# Patient Record
Sex: FEMALE | Race: WHITE | HISPANIC OR LATINO | URBAN - METROPOLITAN AREA
[De-identification: names, ages, dates, MRNs, and addresses within clinical notes are randomized per-mention and may not be internally consistent; named-entity substitution may affect disease eponyms.]

---

## 2021-08-25 ENCOUNTER — APPOINTMENT (RX ONLY)
Dept: URBAN - METROPOLITAN AREA CLINIC 139 | Facility: CLINIC | Age: 46
Setting detail: DERMATOLOGY
End: 2021-08-25

## 2021-08-25 DIAGNOSIS — B35.3 TINEA PEDIS: ICD-10-CM | Status: INADEQUATELY CONTROLLED

## 2021-08-25 DIAGNOSIS — L82.0 INFLAMED SEBORRHEIC KERATOSIS: ICD-10-CM

## 2021-08-25 DIAGNOSIS — B35.1 TINEA UNGUIUM: ICD-10-CM

## 2021-08-25 PROBLEM — D23.72 OTHER BENIGN NEOPLASM OF SKIN OF LEFT LOWER LIMB, INCLUDING HIP: Status: ACTIVE | Noted: 2021-08-25

## 2021-08-25 PROCEDURE — ? COUNSELING

## 2021-08-25 PROCEDURE — 17110 DESTRUCTION B9 LES UP TO 14: CPT | Mod: 52

## 2021-08-25 PROCEDURE — 99204 OFFICE O/P NEW MOD 45 MIN: CPT | Mod: 25

## 2021-08-25 PROCEDURE — ? LIQUID NITROGEN

## 2021-08-25 PROCEDURE — ? PRESCRIPTION

## 2021-08-25 RX ORDER — TERBINAFINE HYDROCHLORIDE 250 MG/1
TABLET ORAL AS DIRECTED
Qty: 21 | Refills: 0 | Status: ERX | COMMUNITY
Start: 2021-08-25

## 2021-08-25 RX ORDER — KETOCONAZOLE 20 MG/G
CREAM TOPICAL BID
Qty: 1 | Refills: 3 | Status: ERX | COMMUNITY
Start: 2021-08-25

## 2021-08-25 RX ADMIN — TERBINAFINE HYDROCHLORIDE: 250 TABLET ORAL at 00:00

## 2021-08-25 RX ADMIN — KETOCONAZOLE: 20 CREAM TOPICAL at 00:00

## 2021-08-25 ASSESSMENT — LOCATION DETAILED DESCRIPTION DERM
LOCATION DETAILED: 2ND WEBSPACE RIGHT FOOT
LOCATION DETAILED: RIGHT ANKLE
LOCATION DETAILED: LEFT DORSAL FOOT
LOCATION DETAILED: LEFT AXILLARY VAULT
LOCATION DETAILED: RIGHT AXILLARY VAULT
LOCATION DETAILED: RIGHT POSTERIOR AXILLA
LOCATION DETAILED: 1ST WEBSPACE LEFT FOOT
LOCATION DETAILED: RIGHT ANTERIOR MEDIAL PROXIMAL UPPER ARM

## 2021-08-25 ASSESSMENT — LOCATION ZONE DERM
LOCATION ZONE: AXILLAE
LOCATION ZONE: ARM
LOCATION ZONE: LEG
LOCATION ZONE: FEET

## 2021-08-25 ASSESSMENT — LOCATION SIMPLE DESCRIPTION DERM
LOCATION SIMPLE: RIGHT POSTERIOR AXILLA
LOCATION SIMPLE: LEFT FOOT
LOCATION SIMPLE: RIGHT FOOT
LOCATION SIMPLE: RIGHT ANKLE
LOCATION SIMPLE: RIGHT UPPER ARM
LOCATION SIMPLE: LEFT AXILLARY VAULT
LOCATION SIMPLE: RIGHT AXILLARY VAULT

## 2021-08-25 NOTE — PROCEDURE: LIQUID NITROGEN
Consent: The patient's consent was obtained including but not limited to risks of crusting, scabbing, blistering, scarring, darker or lighter pigmentary change, recurrence, incomplete removal and infection.
Medical Necessity Information: It is in your best interest to select a reason for this procedure from the list below. All of these items fulfill various CMS LCD requirements except the new and changing color options.
Medical Necessity Clause: This procedure was medically necessary because the lesions that were treated were:
Number Of Freeze-Thaw Cycles: 1 freeze-thaw cycle
Show Topical Anesthesia Variable?: Yes
Render Post-Care Instructions In Note?: no
Post-Care Instructions: I reviewed with the patient in detail post-care instructions. Patient is to wear sunprotection, and avoid picking at any of the treated lesions. Pt may apply Vaseline to crusted or scabbing areas.
Duration Of Freeze Thaw-Cycle (Seconds): 5-10
Aperture Size (Optional): C
Detail Level: Zone

## 2021-08-31 ENCOUNTER — RX ONLY (OUTPATIENT)
Age: 46
Setting detail: RX ONLY
End: 2021-08-31

## 2021-08-31 ENCOUNTER — APPOINTMENT (RX ONLY)
Dept: URBAN - METROPOLITAN AREA CLINIC 139 | Facility: CLINIC | Age: 46
Setting detail: DERMATOLOGY
End: 2021-08-31

## 2021-08-31 DIAGNOSIS — Z41.9 ENCOUNTER FOR PROCEDURE FOR PURPOSES OTHER THAN REMEDYING HEALTH STATE, UNSPECIFIED: ICD-10-CM

## 2021-08-31 PROCEDURE — ? PRODUCT LINE (OFFICE PRODUCTS)

## 2021-08-31 PROCEDURE — ? COSMETIC CONSULTATION: SKIN CARE PRODUCTS AND SERVICES

## 2021-08-31 PROCEDURE — ? LASER HAIR REMOVAL

## 2021-08-31 PROCEDURE — ? PRODUCT LINE (ZO SKIN HEALTH)

## 2021-08-31 RX ORDER — HYDROQUINONE 40 MG/G
CREAM TOPICAL
Qty: 28.35 | Refills: 1 | COMMUNITY
Start: 2021-08-31

## 2021-08-31 RX ORDER — TRETIONIN 0.5 MG/G
CREAM TOPICAL
Qty: 20 | Refills: 2 | COMMUNITY
Start: 2021-08-31

## 2021-08-31 NOTE — PROCEDURE: PRODUCT LINE ([facility])
Product 26 Price (In Dollars - Numeric Only, No Special Characters Or $): 0.00
Product 6 Application Directions: AM
Product 31 Units: 0
Product 23 Application Directions: Apply to clean face at bedtime
Product 15 Price (In Dollars - Numeric Only, No Special Characters Or $): 39.00
Product 9 Price (In Dollars - Numeric Only, No Special Characters Or $): 125.00
Name Of Product 16: Exfoliating Polish
Product 21 Price (In Dollars - Numeric Only, No Special Characters Or $): 60.00
Product 12 Application Directions: AM & PM
Product 4 Price (In Dollars - Numeric Only, No Special Characters Or $): 106.00
Name Of Product 24: Exfoliating Accelerator
Assigning Risk Information: Per AMA, level of risk is based upon consequences of the problem(s) addressed at the encounter when appropriately treated. Risk also includes medical decision making related to the need to initiate or forego further testing, treatment and/or hospitalization. Over the counter medication are assigned a risk level of low. Prescription medication management is assigned a risk level of moderate.
Name Of Product 7: Pigment Control Creme
Risk Of Complication Category: Moderate (Prescription Medication Management)
Product 16 Price (In Dollars - Numeric Only, No Special Characters Or $): 67.00
Name Of Product 2: Refissa
Name Of Product 19: Sunscreen with Primer
Name Of Product 13: Gentle Cleanser
Product 24 Price (In Dollars - Numeric Only, No Special Characters Or $): 70.00
Product 4 Application Directions: PM
Product 7 Price (In Dollars - Numeric Only, No Special Characters Or $): 66.00
Allow Plan To Count Towards E/M Coding: Yes
Product 13 Price (In Dollars - Numeric Only, No Special Characters Or $): 45.00
Product 19 Price (In Dollars - Numeric Only, No Special Characters Or $): 65.00
Product 16 Application Directions: 2-3 X per week
Name Of Product 10: Hydrating Créme
Name Of Product 22: Growth Factor Eye Serum
Name Of Product 5: Recovery Créme
Product 24 Application Directions: Apply in morning after washing face
Product 7 Units: 1
Product 10 Price (In Dollars - Numeric Only, No Special Characters Or $): 97.00
Name Of Product 17: Radical Night Repair
Product 22 Price (In Dollars - Numeric Only, No Special Characters Or $): 130.00
Product 2 Application Directions: Pea size at night
Name Of Product 8: Pigment Control and Blending Creme
Product 17 Price (In Dollars - Numeric Only, No Special Characters Or $): 170.00
Name Of Product 14: Hydrating  Cleanser
Name Of Product 20: Complexion Clearing Masque
Product 11 Price (In Dollars - Numeric Only, No Special Characters Or $): 120.00
Name Of Product 3: Eye Brightening Créme
Product 22 Application Directions: Apply am and pm Allow it to penetrate before applying the Eye Brightening Créme
Product 8 Price (In Dollars - Numeric Only, No Special Characters Or $): 64.00
Product 3 Price (In Dollars - Numeric Only, No Special Characters Or $): 140.00
Name Of Product 11: BrightAlive Skin Brightening Créme
Name Of Product 23: Growth Factor Serum
Name Of Product 6: 10% Vitamin C Activating Serum
Name Of Product 18: Smart Tone
Name Of Product 1: Daily Power Defense
Product 23 Price (In Dollars - Numeric Only, No Special Characters Or $): 148.00
Name Of Product 12: Firming Serum
Product 6 Price (In Dollars - Numeric Only, No Special Characters Or $): 93.00
Product 20 Application Directions: 1 -2 X per week
Name Of Product 9: Pigment Control and Brightening Créme
Product 12 Price (In Dollars - Numeric Only, No Special Characters Or $): 235.00
Product 1 Price (In Dollars - Numeric Only, No Special Characters Or $): 150.00
Detail Level: Zone
Name Of Product 15: Exfoliating Cleanser
Name Of Product 21: Instant Pore Refiner
Name Of Product 4: Renewal Creme

## 2021-08-31 NOTE — HPI: COSMETIC (UNWANTED HAIR)
How Did Your Unwanted Hair Appear?: gradual in onset
How Severe Is Your Unwanted Hair?: severe
Additional History: She states she has dark coarse hair in the bikini area and sometimes gets irritation from the hair so coarse and it rubs. She currently has redness and irritation. She would like to have the first treatment for hair reduction in the bikini area today.  She would like to be treated at the top and sides and a little in the inner thigh area but just not to include the center area.  I quote her $300 per treatment and discussed expectations and need to have at least 3 treatments 6-8 weeks apart and then as needed after that.

## 2021-08-31 NOTE — PROCEDURE: PRODUCT LINE (OFFICE PRODUCTS)
Allow Plan To Count Towards E/M Coding: Yes
Product 28 Units: 0
Product 2 Price (In Dollars - Numeric Only, No Special Characters Or $): 46.00
Product 44 Price (In Dollars - Numeric Only, No Special Characters Or $): 0.00
Product 2 Application Directions: Apply a thin layer to scar am and pm
Name Of Product 3: Elta MD UV Clear Broad Spectrum SPF 46
Product 3 Price (In Dollars - Numeric Only, No Special Characters Or $): 32.00
Name Of Product 4: Tretinoin 0.05% cream
Name Of Product 1: Latisse
Product 4 Price (In Dollars - Numeric Only, No Special Characters Or $): 65.00
Product 3 Application Directions: Reapply every two hours to skin when in the sun.
Product 1 Price (In Dollars - Numeric Only, No Special Characters Or $): 175.00
Detail Level: Zone
Product 4 Units: 1
Product 1 Application Directions: Apply one drop using brush included on the kit to the eyelash line taking care not to get the product into the eye.
Assigning Risk Information: Per AMA, level of risk is based upon consequences of the problem(s) addressed at the encounter when appropriately treated. Risk also includes medical decision making related to the need to initiate or forego further testing, treatment and/or hospitalization. Over the counter medication are assigned a risk level of low. Prescription medication management is assigned a risk level of moderate.
Risk Of Complication Category: Moderate (Prescription Medication Management)
Name Of Product 2: Agnesigel

## 2021-10-12 ENCOUNTER — APPOINTMENT (RX ONLY)
Dept: URBAN - METROPOLITAN AREA CLINIC 139 | Facility: CLINIC | Age: 46
Setting detail: DERMATOLOGY
End: 2021-10-12

## 2021-10-12 DIAGNOSIS — Z41.9 ENCOUNTER FOR PROCEDURE FOR PURPOSES OTHER THAN REMEDYING HEALTH STATE, UNSPECIFIED: ICD-10-CM

## 2021-10-12 PROCEDURE — ? LASER HAIR REMOVAL

## 2021-10-12 PROCEDURE — ? COSMETIC CONSULTATION: SKIN CARE PRODUCTS AND SERVICES

## 2021-10-12 ASSESSMENT — LOCATION DETAILED DESCRIPTION DERM
LOCATION DETAILED: LEFT ANTERIOR PROXIMAL THIGH
LOCATION DETAILED: MONS PUBIS

## 2021-10-12 ASSESSMENT — LOCATION SIMPLE DESCRIPTION DERM
LOCATION SIMPLE: LEFT THIGH
LOCATION SIMPLE: GROIN

## 2021-10-12 ASSESSMENT — LOCATION ZONE DERM
LOCATION ZONE: LEG
LOCATION ZONE: VULVA

## 2021-10-12 NOTE — HPI: COSMETIC (UNWANTED HAIR)
How Did Your Unwanted Hair Appear?: gradual in onset
How Severe Is Your Unwanted Hair?: moderate
Additional History: She is here today for her 2nd hair reduction treatment in the bikini area. She states she is already noticing patches where the hair is thinning in the area that we treated. She does not want the center of the bikini area treated but does have the inner thighs treated.

## 2021-11-30 ENCOUNTER — APPOINTMENT (RX ONLY)
Dept: URBAN - METROPOLITAN AREA CLINIC 139 | Facility: CLINIC | Age: 46
Setting detail: DERMATOLOGY
End: 2021-11-30

## 2021-11-30 DIAGNOSIS — Z41.9 ENCOUNTER FOR PROCEDURE FOR PURPOSES OTHER THAN REMEDYING HEALTH STATE, UNSPECIFIED: ICD-10-CM

## 2021-11-30 PROCEDURE — ? LASER HAIR REMOVAL

## 2021-11-30 ASSESSMENT — LOCATION ZONE DERM
LOCATION ZONE: VULVA
LOCATION ZONE: LEG

## 2021-11-30 ASSESSMENT — LOCATION DETAILED DESCRIPTION DERM
LOCATION DETAILED: LEFT ANTERIOR PROXIMAL THIGH
LOCATION DETAILED: MONS PUBIS
LOCATION DETAILED: RIGHT ANTERIOR PROXIMAL THIGH

## 2021-11-30 ASSESSMENT — LOCATION SIMPLE DESCRIPTION DERM
LOCATION SIMPLE: GROIN
LOCATION SIMPLE: LEFT THIGH
LOCATION SIMPLE: RIGHT THIGH

## 2021-11-30 NOTE — HPI: COSMETIC (UNWANTED HAIR)
How Did Your Unwanted Hair Appear?: gradual in onset
How Severe Is Your Unwanted Hair?: moderate
Additional History: She is here for her 3rd treatment of hair reduction to the bikini area not including the center. She states she has noticed she has bald patchy areas and the hair doesn’t the growth is much slower.  We discussed that after this treatment she only needs to come when she feels she needs another treatment and the time between treatments will gradually get longer.
